# Patient Record
Sex: MALE | Race: BLACK OR AFRICAN AMERICAN | NOT HISPANIC OR LATINO
[De-identification: names, ages, dates, MRNs, and addresses within clinical notes are randomized per-mention and may not be internally consistent; named-entity substitution may affect disease eponyms.]

---

## 2017-07-27 ENCOUNTER — APPOINTMENT (OUTPATIENT)
Dept: INTERNAL MEDICINE | Facility: CLINIC | Age: 57
End: 2017-07-27
Payer: COMMERCIAL

## 2017-07-27 VITALS
HEART RATE: 63 BPM | HEIGHT: 71 IN | SYSTOLIC BLOOD PRESSURE: 150 MMHG | TEMPERATURE: 99.3 F | OXYGEN SATURATION: 97 % | BODY MASS INDEX: 32.62 KG/M2 | DIASTOLIC BLOOD PRESSURE: 90 MMHG | WEIGHT: 233 LBS

## 2017-07-27 DIAGNOSIS — Z00.00 ENCOUNTER FOR GENERAL ADULT MEDICAL EXAMINATION W/OUT ABNORMAL FINDINGS: ICD-10-CM

## 2017-07-27 PROCEDURE — 99396 PREV VISIT EST AGE 40-64: CPT | Mod: GC

## 2017-07-28 LAB
ANION GAP SERPL CALC-SCNC: 13 MMOL/L
BUN SERPL-MCNC: 9 MG/DL
CALCIUM SERPL-MCNC: 8.8 MG/DL
CHLORIDE SERPL-SCNC: 100 MMOL/L
CHOLEST SERPL-MCNC: 179 MG/DL
CHOLEST/HDLC SERPL: 4.4 RATIO
CO2 SERPL-SCNC: 28 MMOL/L
CREAT SERPL-MCNC: 1.01 MG/DL
GLUCOSE SERPL-MCNC: 98 MG/DL
HBA1C MFR BLD HPLC: 5.9 %
HDLC SERPL-MCNC: 41 MG/DL
LDLC SERPL CALC-MCNC: 106 MG/DL
POTASSIUM SERPL-SCNC: 4.1 MMOL/L
SODIUM SERPL-SCNC: 141 MMOL/L
TRIGL SERPL-MCNC: 161 MG/DL

## 2017-08-24 ENCOUNTER — APPOINTMENT (OUTPATIENT)
Dept: BARIATRICS | Facility: CLINIC | Age: 57
End: 2017-08-24
Payer: COMMERCIAL

## 2017-08-24 VITALS
HEART RATE: 69 BPM | TEMPERATURE: 98.1 F | WEIGHT: 231 LBS | HEIGHT: 71 IN | BODY MASS INDEX: 32.34 KG/M2 | SYSTOLIC BLOOD PRESSURE: 165 MMHG | DIASTOLIC BLOOD PRESSURE: 104 MMHG | OXYGEN SATURATION: 98 %

## 2017-08-24 DIAGNOSIS — K46.9 UNSPECIFIED ABDOMINAL HERNIA W/OUT OBSTRUCTION OR GANGRENE: ICD-10-CM

## 2017-08-24 PROCEDURE — 99203 OFFICE O/P NEW LOW 30 MIN: CPT

## 2017-08-25 ENCOUNTER — APPOINTMENT (OUTPATIENT)
Dept: INTERNAL MEDICINE | Facility: CLINIC | Age: 57
End: 2017-08-25
Payer: COMMERCIAL

## 2017-08-25 VITALS
SYSTOLIC BLOOD PRESSURE: 150 MMHG | OXYGEN SATURATION: 97 % | WEIGHT: 231 LBS | DIASTOLIC BLOOD PRESSURE: 98 MMHG | BODY MASS INDEX: 32.34 KG/M2 | HEIGHT: 71 IN | TEMPERATURE: 99 F | HEART RATE: 82 BPM

## 2017-08-25 VITALS — DIASTOLIC BLOOD PRESSURE: 92 MMHG | SYSTOLIC BLOOD PRESSURE: 138 MMHG

## 2017-08-25 DIAGNOSIS — Z01.818 ENCOUNTER FOR OTHER PREPROCEDURAL EXAMINATION: ICD-10-CM

## 2017-08-25 PROCEDURE — 99214 OFFICE O/P EST MOD 30 MIN: CPT | Mod: 25,GC

## 2017-08-25 PROCEDURE — 93000 ELECTROCARDIOGRAM COMPLETE: CPT

## 2017-08-25 PROCEDURE — 36415 COLL VENOUS BLD VENIPUNCTURE: CPT

## 2017-08-29 LAB
ALBUMIN SERPL ELPH-MCNC: 4.3 G/DL
ALP BLD-CCNC: 64 U/L
ALT SERPL-CCNC: 19 U/L
ANION GAP SERPL CALC-SCNC: 15 MMOL/L
APPEARANCE: ABNORMAL
APTT BLD: 36.5 SEC
AST SERPL-CCNC: 17 U/L
BACTERIA: NEGATIVE
BASOPHILS # BLD AUTO: 0.03 K/UL
BASOPHILS NFR BLD AUTO: 1 %
BILIRUB SERPL-MCNC: 0.5 MG/DL
BILIRUBIN URINE: NEGATIVE
BLOOD URINE: NEGATIVE
BUN SERPL-MCNC: 17 MG/DL
CALCIUM SERPL-MCNC: 9.2 MG/DL
CHLORIDE SERPL-SCNC: 104 MMOL/L
CO2 SERPL-SCNC: 25 MMOL/L
COLOR: YELLOW
CREAT SERPL-MCNC: 1.27 MG/DL
EOSINOPHIL # BLD AUTO: 0.13 K/UL
EOSINOPHIL NFR BLD AUTO: 4.3 %
GLUCOSE QUALITATIVE U: NORMAL
GLUCOSE SERPL-MCNC: 105 MG/DL
HBA1C MFR BLD HPLC: 5.8 %
HCT VFR BLD CALC: 43.8 %
HGB BLD-MCNC: 14.6 G/DL
HYALINE CASTS: 3 /LPF
IMM GRANULOCYTES NFR BLD AUTO: 0.3 %
INR PPP: 1.03 RATIO
KETONES URINE: NEGATIVE
LEUKOCYTE ESTERASE URINE: ABNORMAL
LYMPHOCYTES # BLD AUTO: 1.06 K/UL
LYMPHOCYTES NFR BLD AUTO: 35 %
MAN DIFF?: NORMAL
MCHC RBC-ENTMCNC: 29.9 PG
MCHC RBC-ENTMCNC: 33.3 GM/DL
MCV RBC AUTO: 89.8 FL
MICROSCOPIC-UA: NORMAL
MONOCYTES # BLD AUTO: 0.25 K/UL
MONOCYTES NFR BLD AUTO: 8.3 %
NEUTROPHILS # BLD AUTO: 1.55 K/UL
NEUTROPHILS NFR BLD AUTO: 51.1 %
NITRITE URINE: NEGATIVE
PH URINE: 5.5
PLATELET # BLD AUTO: 166 K/UL
POTASSIUM SERPL-SCNC: 4.3 MMOL/L
PROT SERPL-MCNC: 7.1 G/DL
PROTEIN URINE: NEGATIVE
PT BLD: 11.6 SEC
RBC # BLD: 4.88 M/UL
RBC # FLD: 13.8 %
RED BLOOD CELLS URINE: 5 /HPF
SODIUM SERPL-SCNC: 144 MMOL/L
SPECIFIC GRAVITY URINE: 1.03
SQUAMOUS EPITHELIAL CELLS: 2 /HPF
TSH SERPL-ACNC: 0.79 UIU/ML
UROBILINOGEN URINE: 1
WBC # FLD AUTO: 3.03 K/UL
WHITE BLOOD CELLS URINE: 8 /HPF

## 2017-09-04 ENCOUNTER — FORM ENCOUNTER (OUTPATIENT)
Age: 57
End: 2017-09-04

## 2017-09-05 ENCOUNTER — OUTPATIENT (OUTPATIENT)
Dept: OUTPATIENT SERVICES | Facility: HOSPITAL | Age: 57
LOS: 1 days | End: 2017-09-05
Payer: COMMERCIAL

## 2017-09-05 PROCEDURE — 71020: CPT | Mod: 26

## 2017-09-05 PROCEDURE — 71046 X-RAY EXAM CHEST 2 VIEWS: CPT

## 2017-09-19 ENCOUNTER — APPOINTMENT (OUTPATIENT)
Dept: BARIATRICS | Facility: HOSPITAL | Age: 57
End: 2017-09-19

## 2017-09-19 ENCOUNTER — RESULT REVIEW (OUTPATIENT)
Age: 57
End: 2017-09-19

## 2017-09-19 ENCOUNTER — OUTPATIENT (OUTPATIENT)
Dept: OUTPATIENT SERVICES | Facility: HOSPITAL | Age: 57
LOS: 1 days | Discharge: HOME | End: 2017-09-19
Payer: COMMERCIAL

## 2017-09-19 PROCEDURE — 27337 EXC THIGH/KNEE LES SC 3 CM/>: CPT | Mod: GC

## 2017-09-19 PROCEDURE — 19260: CPT | Mod: GC

## 2017-09-21 ENCOUNTER — OTHER (OUTPATIENT)
Age: 57
End: 2017-09-21

## 2017-09-22 LAB — SURGICAL PATHOLOGY STUDY: SIGNIFICANT CHANGE UP

## 2017-09-25 DIAGNOSIS — D17.39 BENIGN LIPOMATOUS NEOPLASM OF SKIN AND SUBCUTANEOUS TISSUE OF OTHER SITES: ICD-10-CM

## 2017-09-25 DIAGNOSIS — D17.23 BENIGN LIPOMATOUS NEOPLASM OF SKIN AND SUBCUTANEOUS TISSUE OF RIGHT LEG: ICD-10-CM

## 2017-09-28 ENCOUNTER — OTHER (OUTPATIENT)
Age: 57
End: 2017-09-28

## 2017-09-28 ENCOUNTER — APPOINTMENT (OUTPATIENT)
Dept: INTERNAL MEDICINE | Facility: CLINIC | Age: 57
End: 2017-09-28
Payer: COMMERCIAL

## 2017-09-28 VITALS — DIASTOLIC BLOOD PRESSURE: 90 MMHG | SYSTOLIC BLOOD PRESSURE: 171 MMHG

## 2017-09-28 VITALS
SYSTOLIC BLOOD PRESSURE: 171 MMHG | TEMPERATURE: 98.5 F | OXYGEN SATURATION: 97 % | DIASTOLIC BLOOD PRESSURE: 116 MMHG | HEIGHT: 71 IN | BODY MASS INDEX: 32.34 KG/M2 | HEART RATE: 61 BPM | WEIGHT: 231 LBS

## 2017-09-28 PROCEDURE — 99213 OFFICE O/P EST LOW 20 MIN: CPT | Mod: GE

## 2017-10-04 ENCOUNTER — APPOINTMENT (OUTPATIENT)
Dept: BARIATRICS | Facility: CLINIC | Age: 57
End: 2017-10-04
Payer: COMMERCIAL

## 2017-10-04 VITALS
DIASTOLIC BLOOD PRESSURE: 100 MMHG | BODY MASS INDEX: 32.06 KG/M2 | TEMPERATURE: 98.7 F | OXYGEN SATURATION: 98 % | HEIGHT: 71 IN | SYSTOLIC BLOOD PRESSURE: 152 MMHG | WEIGHT: 229 LBS | HEART RATE: 61 BPM

## 2017-10-04 PROCEDURE — 99024 POSTOP FOLLOW-UP VISIT: CPT

## 2017-11-16 ENCOUNTER — APPOINTMENT (OUTPATIENT)
Dept: INTERNAL MEDICINE | Facility: CLINIC | Age: 57
End: 2017-11-16
Payer: COMMERCIAL

## 2017-11-16 VITALS
WEIGHT: 234 LBS | BODY MASS INDEX: 32.64 KG/M2 | OXYGEN SATURATION: 96 % | TEMPERATURE: 98.5 F | DIASTOLIC BLOOD PRESSURE: 100 MMHG | SYSTOLIC BLOOD PRESSURE: 160 MMHG | HEART RATE: 60 BPM

## 2017-11-16 PROCEDURE — 99213 OFFICE O/P EST LOW 20 MIN: CPT | Mod: GE

## 2017-11-16 RX ORDER — LISINOPRIL 20 MG/1
20 TABLET ORAL DAILY
Qty: 30 | Refills: 2 | Status: DISCONTINUED | COMMUNITY
Start: 2017-09-28 | End: 2017-11-16

## 2018-01-11 ENCOUNTER — APPOINTMENT (OUTPATIENT)
Dept: INTERNAL MEDICINE | Facility: CLINIC | Age: 58
End: 2018-01-11
Payer: COMMERCIAL

## 2018-01-11 VITALS
TEMPERATURE: 98.9 F | BODY MASS INDEX: 32.08 KG/M2 | OXYGEN SATURATION: 98 % | SYSTOLIC BLOOD PRESSURE: 162 MMHG | WEIGHT: 230 LBS | HEART RATE: 54 BPM | DIASTOLIC BLOOD PRESSURE: 93 MMHG

## 2018-01-11 DIAGNOSIS — Z98.890 OTHER SPECIFIED POSTPROCEDURAL STATES: ICD-10-CM

## 2018-01-11 DIAGNOSIS — Z86.018 OTHER SPECIFIED POSTPROCEDURAL STATES: ICD-10-CM

## 2018-01-11 DIAGNOSIS — D17.9 BENIGN LIPOMATOUS NEOPLASM, UNSPECIFIED: ICD-10-CM

## 2018-01-11 PROCEDURE — 99214 OFFICE O/P EST MOD 30 MIN: CPT | Mod: GC

## 2018-01-11 RX ORDER — HYDROCHLOROTHIAZIDE 12.5 MG/1
12.5 TABLET ORAL DAILY
Qty: 30 | Refills: 5 | Status: DISCONTINUED | COMMUNITY
Start: 2017-11-16 | End: 2018-01-11

## 2018-01-29 ENCOUNTER — RX RENEWAL (OUTPATIENT)
Age: 58
End: 2018-01-29

## 2018-03-08 ENCOUNTER — APPOINTMENT (OUTPATIENT)
Dept: INTERNAL MEDICINE | Facility: CLINIC | Age: 58
End: 2018-03-08
Payer: COMMERCIAL

## 2018-03-08 VITALS
SYSTOLIC BLOOD PRESSURE: 152 MMHG | BODY MASS INDEX: 32.22 KG/M2 | WEIGHT: 231 LBS | DIASTOLIC BLOOD PRESSURE: 83 MMHG | TEMPERATURE: 98.5 F

## 2018-03-08 PROCEDURE — 99213 OFFICE O/P EST LOW 20 MIN: CPT | Mod: 25,GE

## 2018-03-08 PROCEDURE — 36415 COLL VENOUS BLD VENIPUNCTURE: CPT

## 2018-03-08 RX ORDER — ASPIRIN 81 MG/1
81 TABLET ORAL
Qty: 1 | Refills: 0 | Status: DISCONTINUED | COMMUNITY
Start: 2017-11-16 | End: 2018-03-08

## 2018-03-15 LAB
25(OH)D3 SERPL-MCNC: 27.2 NG/ML
ANION GAP SERPL CALC-SCNC: 12 MMOL/L
BUN SERPL-MCNC: 11 MG/DL
CALCIUM SERPL-MCNC: 10.2 MG/DL
CHLORIDE SERPL-SCNC: 100 MMOL/L
CHOLEST SERPL-MCNC: 131 MG/DL
CHOLEST/HDLC SERPL: 3 RATIO
CO2 SERPL-SCNC: 30 MMOL/L
CREAT SERPL-MCNC: 1.12 MG/DL
GLUCOSE SERPL-MCNC: 88 MG/DL
HBA1C MFR BLD HPLC: 6 %
HDLC SERPL-MCNC: 44 MG/DL
LDLC SERPL CALC-MCNC: 66 MG/DL
POTASSIUM SERPL-SCNC: 4.2 MMOL/L
SODIUM SERPL-SCNC: 142 MMOL/L
TRIGL SERPL-MCNC: 107 MG/DL

## 2018-05-11 ENCOUNTER — APPOINTMENT (OUTPATIENT)
Dept: INTERNAL MEDICINE | Facility: CLINIC | Age: 58
End: 2018-05-11
Payer: COMMERCIAL

## 2018-05-11 VITALS
TEMPERATURE: 98 F | HEIGHT: 71 IN | DIASTOLIC BLOOD PRESSURE: 67 MMHG | BODY MASS INDEX: 32.34 KG/M2 | WEIGHT: 231 LBS | OXYGEN SATURATION: 98 % | SYSTOLIC BLOOD PRESSURE: 114 MMHG | HEART RATE: 100 BPM

## 2018-05-11 PROCEDURE — 99213 OFFICE O/P EST LOW 20 MIN: CPT | Mod: GE

## 2018-05-14 LAB
CREAT SPEC-SCNC: 249 MG/DL
MICROALBUMIN 24H UR DL<=1MG/L-MCNC: 4 MG/DL
MICROALBUMIN/CREAT 24H UR-RTO: 16 MG/G

## 2018-06-04 ENCOUNTER — RX RENEWAL (OUTPATIENT)
Age: 58
End: 2018-06-04

## 2018-06-12 ENCOUNTER — MEDICATION RENEWAL (OUTPATIENT)
Age: 58
End: 2018-06-12

## 2018-07-30 ENCOUNTER — RX RENEWAL (OUTPATIENT)
Age: 58
End: 2018-07-30

## 2018-08-22 ENCOUNTER — RX RENEWAL (OUTPATIENT)
Age: 58
End: 2018-08-22

## 2018-09-06 ENCOUNTER — APPOINTMENT (OUTPATIENT)
Dept: INTERNAL MEDICINE | Facility: CLINIC | Age: 58
End: 2018-09-06
Payer: COMMERCIAL

## 2018-09-06 VITALS
DIASTOLIC BLOOD PRESSURE: 96 MMHG | HEIGHT: 71 IN | HEART RATE: 67 BPM | SYSTOLIC BLOOD PRESSURE: 155 MMHG | BODY MASS INDEX: 32.34 KG/M2 | WEIGHT: 231 LBS | OXYGEN SATURATION: 95 % | TEMPERATURE: 99.1 F

## 2018-09-06 DIAGNOSIS — R73.09 OTHER ABNORMAL GLUCOSE: ICD-10-CM

## 2018-09-06 PROCEDURE — 99213 OFFICE O/P EST LOW 20 MIN: CPT | Mod: GE

## 2018-09-06 RX ORDER — LISINOPRIL 40 MG/1
40 TABLET ORAL DAILY
Qty: 30 | Refills: 3 | Status: COMPLETED | COMMUNITY
Start: 2017-11-16 | End: 2018-09-06

## 2018-09-06 RX ORDER — HYDROCHLOROTHIAZIDE 25 MG/1
25 TABLET ORAL DAILY
Qty: 90 | Refills: 0 | Status: COMPLETED | COMMUNITY
Start: 2018-01-11 | End: 2018-09-06

## 2018-09-17 ENCOUNTER — RX RENEWAL (OUTPATIENT)
Age: 58
End: 2018-09-17

## 2018-12-19 ENCOUNTER — RX RENEWAL (OUTPATIENT)
Age: 58
End: 2018-12-19

## 2021-02-04 ENCOUNTER — APPOINTMENT (OUTPATIENT)
Dept: INTERNAL MEDICINE | Facility: CLINIC | Age: 61
End: 2021-02-04
Payer: COMMERCIAL

## 2021-02-04 VITALS
DIASTOLIC BLOOD PRESSURE: 122 MMHG | SYSTOLIC BLOOD PRESSURE: 174 MMHG | TEMPERATURE: 98 F | BODY MASS INDEX: 34.17 KG/M2 | HEART RATE: 79 BPM | OXYGEN SATURATION: 96 % | WEIGHT: 245 LBS

## 2021-02-04 VITALS — DIASTOLIC BLOOD PRESSURE: 110 MMHG | SYSTOLIC BLOOD PRESSURE: 170 MMHG

## 2021-02-04 DIAGNOSIS — Z13.1 ENCOUNTER FOR SCREENING FOR DIABETES MELLITUS: ICD-10-CM

## 2021-02-04 DIAGNOSIS — I10 ESSENTIAL (PRIMARY) HYPERTENSION: ICD-10-CM

## 2021-02-04 DIAGNOSIS — Z23 ENCOUNTER FOR IMMUNIZATION: ICD-10-CM

## 2021-02-04 DIAGNOSIS — I16.0 HYPERTENSIVE URGENCY: ICD-10-CM

## 2021-02-04 DIAGNOSIS — Z12.11 ENCOUNTER FOR SCREENING FOR MALIGNANT NEOPLASM OF COLON: ICD-10-CM

## 2021-02-04 PROCEDURE — G0008: CPT

## 2021-02-04 PROCEDURE — 99213 OFFICE O/P EST LOW 20 MIN: CPT | Mod: GC,25

## 2021-02-04 PROCEDURE — 99072 ADDL SUPL MATRL&STAF TM PHE: CPT

## 2021-02-04 PROCEDURE — 36415 COLL VENOUS BLD VENIPUNCTURE: CPT

## 2021-02-04 PROCEDURE — 99396 PREV VISIT EST AGE 40-64: CPT | Mod: 25

## 2021-02-04 PROCEDURE — 90686 IIV4 VACC NO PRSV 0.5 ML IM: CPT

## 2021-02-09 LAB
CREAT SPEC-SCNC: 170 MG/DL
MICROALBUMIN 24H UR DL<=1MG/L-MCNC: 2.6 MG/DL
MICROALBUMIN/CREAT 24H UR-RTO: 15 MG/G

## 2021-02-11 ENCOUNTER — APPOINTMENT (OUTPATIENT)
Dept: INTERNAL MEDICINE | Facility: CLINIC | Age: 61
End: 2021-02-11
Payer: COMMERCIAL

## 2021-02-11 VITALS
TEMPERATURE: 98.3 F | SYSTOLIC BLOOD PRESSURE: 167 MMHG | HEART RATE: 79 BPM | RESPIRATION RATE: 16 BRPM | DIASTOLIC BLOOD PRESSURE: 95 MMHG | OXYGEN SATURATION: 94 %

## 2021-02-11 DIAGNOSIS — L73.1 PSEUDOFOLLICULITIS BARBAE: ICD-10-CM

## 2021-02-11 LAB
ANION GAP SERPL CALC-SCNC: 22 MMOL/L
BUN SERPL-MCNC: 10 MG/DL
CALCIUM SERPL-MCNC: 9.5 MG/DL
CHLORIDE SERPL-SCNC: 102 MMOL/L
CHOLEST SERPL-MCNC: 215 MG/DL
CO2 SERPL-SCNC: 18 MMOL/L
CREAT SERPL-MCNC: 1.11 MG/DL
ESTIMATED AVERAGE GLUCOSE: 120 MG/DL
GLUCOSE SERPL-MCNC: 83 MG/DL
HBA1C MFR BLD HPLC: 5.8 %
HDLC SERPL-MCNC: 43 MG/DL
LDLC SERPL CALC-MCNC: 148 MG/DL
NONHDLC SERPL-MCNC: 172 MG/DL
POTASSIUM SERPL-SCNC: 4 MMOL/L
SODIUM SERPL-SCNC: 142 MMOL/L
TRIGL SERPL-MCNC: 120 MG/DL

## 2021-02-11 PROCEDURE — 99072 ADDL SUPL MATRL&STAF TM PHE: CPT

## 2021-02-11 PROCEDURE — 99214 OFFICE O/P EST MOD 30 MIN: CPT | Mod: GC

## 2021-02-11 NOTE — PHYSICAL EXAM
[No Acute Distress] : no acute distress [Well Nourished] : well nourished [Well Developed] : well developed [Normal Sclera/Conjunctiva] : normal sclera/conjunctiva [PERRL] : pupils equal round and reactive to light [EOMI] : extraocular movements intact [Normal Outer Ear/Nose] : the outer ears and nose were normal in appearance [Normal Oropharynx] : the oropharynx was normal [No JVD] : no jugular venous distention [No Lymphadenopathy] : no lymphadenopathy [Supple] : supple [No Respiratory Distress] : no respiratory distress  [No Accessory Muscle Use] : no accessory muscle use [Clear to Auscultation] : lungs were clear to auscultation bilaterally [Normal Rate] : normal rate  [Regular Rhythm] : with a regular rhythm [Normal S1, S2] : normal S1 and S2 [No Murmur] : no murmur heard [Soft] : abdomen soft [Non Tender] : non-tender [Non-distended] : non-distended [Normal Bowel Sounds] : normal bowel sounds [No CVA Tenderness] : no CVA  tenderness [No Spinal Tenderness] : no spinal tenderness [Normal] : no CVA or spinal tenderness

## 2021-02-12 PROBLEM — L73.1 PSEUDOFOLLICULITIS BARBAE: Status: ACTIVE | Noted: 2021-02-09

## 2021-02-12 NOTE — END OF VISIT
[FreeTextEntry3] : I saw and evaluated the patient.  The findings and assessment were discussed with the resident and I agree with resident’s plan as documented in the above, in the resident’s note.\par \par Pertinent exam findings: Well-appearing, NAD. \par \par Elevated BP: Improved from last visit.  Needs second agent.  Start losartan 50mg today.  May need HCTZ in future, will consider combo pill to decrease pill burden.  C/w norvasc\par \par C/w lifestyle mods for pre-DM and obesity.\par \par RTC 1 week for BP reassessment.\par \par I spent more than 30 minutes for the total time of this encounter, including time spent face-to-face with the patient.

## 2021-02-12 NOTE — PLAN
[FreeTextEntry1] : #Hypertension - patient with hx of HTN controlled prior with norvasc 10mg qd, patient not compliant with meds for 2 years presents now with BP in 170s. Denies HA, changes in vision, back pain, or chest pain. Norvasc 10 was restarted last week, BP still uncontrolled. \par - start losartan 50mg. Patient advised that if he develops any lightheadedness, syncope to present to call office and stop taking med. \par - c/w norvasc 10mg qd\par - return to clinic in one week for reassessment\par - f/u optho \par \par #Pre-diabetes - patient with a1c of 5.7 recently checked\par - counseled on weigh loss and lifestyle modifications\par \par #Obesity - patient with BMI of 34\par - counseled on weight loss, meal portion sizes and calorie tracking\par - counseled on increase exercise\par \par #HLD\par - c/w lipitor 40mg qd\par \par HCM\par - Flu shot utd\par - f/u GI referral for colonoscopy\par - ophth f/u for HTN\par \par RTC IN ONE WEEK FOR BP REASSESMENT.

## 2021-02-12 NOTE — HISTORY OF PRESENT ILLNESS
[FreeTextEntry1] : HTN [de-identified] : 60yr old M with a PMHx of HTN, pre-DM, HLD elevated ASCVD presents for follow up after initiation of BP meds. Patient was evaluated in office last week and was found to be hypertensive to 170s plan was to re-initiate amlodipine 10mg and one week f/u for reassessment. Patient reports tolerating meds well w/o any side effects. Patient denies HA, CP, sob, back pain.

## 2021-02-18 ENCOUNTER — APPOINTMENT (OUTPATIENT)
Dept: INTERNAL MEDICINE | Facility: CLINIC | Age: 61
End: 2021-02-18
Payer: COMMERCIAL

## 2021-02-18 VITALS — SYSTOLIC BLOOD PRESSURE: 142 MMHG | DIASTOLIC BLOOD PRESSURE: 92 MMHG | HEART RATE: 70 BPM

## 2021-02-18 PROCEDURE — 99072 ADDL SUPL MATRL&STAF TM PHE: CPT

## 2021-02-18 PROCEDURE — 99211 OFF/OP EST MAY X REQ PHY/QHP: CPT

## 2021-03-10 ENCOUNTER — RX RENEWAL (OUTPATIENT)
Age: 61
End: 2021-03-10

## 2021-03-29 ENCOUNTER — RX RENEWAL (OUTPATIENT)
Age: 61
End: 2021-03-29

## 2021-04-01 ENCOUNTER — APPOINTMENT (OUTPATIENT)
Dept: INTERNAL MEDICINE | Facility: CLINIC | Age: 61
End: 2021-04-01
Payer: COMMERCIAL

## 2021-04-01 VITALS
SYSTOLIC BLOOD PRESSURE: 132 MMHG | TEMPERATURE: 98.9 F | BODY MASS INDEX: 34.44 KG/M2 | HEART RATE: 87 BPM | OXYGEN SATURATION: 97 % | HEIGHT: 71 IN | WEIGHT: 246 LBS | RESPIRATION RATE: 15 BRPM | DIASTOLIC BLOOD PRESSURE: 90 MMHG

## 2021-04-01 DIAGNOSIS — E66.9 OBESITY, UNSPECIFIED: ICD-10-CM

## 2021-04-01 PROCEDURE — 99072 ADDL SUPL MATRL&STAF TM PHE: CPT

## 2021-04-01 PROCEDURE — 36415 COLL VENOUS BLD VENIPUNCTURE: CPT

## 2021-04-01 PROCEDURE — 99214 OFFICE O/P EST MOD 30 MIN: CPT | Mod: 25,GC

## 2021-04-08 LAB
ANION GAP SERPL CALC-SCNC: 14 MMOL/L
BUN SERPL-MCNC: 9 MG/DL
CALCIUM SERPL-MCNC: 9.1 MG/DL
CHLORIDE SERPL-SCNC: 101 MMOL/L
CO2 SERPL-SCNC: 26 MMOL/L
CREAT SERPL-MCNC: 1.11 MG/DL
GLUCOSE SERPL-MCNC: 109 MG/DL
POTASSIUM SERPL-SCNC: 3.8 MMOL/L
SODIUM SERPL-SCNC: 142 MMOL/L

## 2021-04-27 ENCOUNTER — RX RENEWAL (OUTPATIENT)
Age: 61
End: 2021-04-27

## 2021-05-27 ENCOUNTER — RX RENEWAL (OUTPATIENT)
Age: 61
End: 2021-05-27

## 2021-06-24 ENCOUNTER — APPOINTMENT (OUTPATIENT)
Dept: INTERNAL MEDICINE | Facility: CLINIC | Age: 61
End: 2021-06-24
Payer: COMMERCIAL

## 2021-06-24 ENCOUNTER — NON-APPOINTMENT (OUTPATIENT)
Age: 61
End: 2021-06-24

## 2021-06-24 VITALS
TEMPERATURE: 98.4 F | BODY MASS INDEX: 33.88 KG/M2 | HEIGHT: 71 IN | HEART RATE: 73 BPM | SYSTOLIC BLOOD PRESSURE: 153 MMHG | DIASTOLIC BLOOD PRESSURE: 96 MMHG | RESPIRATION RATE: 14 BRPM | WEIGHT: 242 LBS | OXYGEN SATURATION: 96 %

## 2021-06-24 DIAGNOSIS — E78.5 HYPERLIPIDEMIA, UNSPECIFIED: ICD-10-CM

## 2021-06-24 PROCEDURE — 36415 COLL VENOUS BLD VENIPUNCTURE: CPT

## 2021-06-24 PROCEDURE — 99072 ADDL SUPL MATRL&STAF TM PHE: CPT

## 2021-06-24 PROCEDURE — 99213 OFFICE O/P EST LOW 20 MIN: CPT | Mod: GC,25

## 2021-06-24 RX ORDER — HYDROCORTISONE 25 MG/G
2.5 CREAM TOPICAL 3 TIMES DAILY
Qty: 1 | Refills: 0 | Status: DISCONTINUED | COMMUNITY
Start: 2021-02-09 | End: 2021-06-24

## 2021-06-25 LAB
ALBUMIN SERPL ELPH-MCNC: 4.5 G/DL
ALP BLD-CCNC: 90 U/L
ALT SERPL-CCNC: 26 U/L
ANION GAP SERPL CALC-SCNC: 11 MMOL/L
AST SERPL-CCNC: 19 U/L
BILIRUB SERPL-MCNC: 0.4 MG/DL
BUN SERPL-MCNC: 8 MG/DL
CALCIUM SERPL-MCNC: 9.6 MG/DL
CHLORIDE SERPL-SCNC: 104 MMOL/L
CHOLEST SERPL-MCNC: 164 MG/DL
CO2 SERPL-SCNC: 27 MMOL/L
CREAT SERPL-MCNC: 1.02 MG/DL
CREAT SPEC-SCNC: 189 MG/DL
ESTIMATED AVERAGE GLUCOSE: 120 MG/DL
GLUCOSE SERPL-MCNC: 94 MG/DL
HBA1C MFR BLD HPLC: 5.8 %
HDLC SERPL-MCNC: 41 MG/DL
LDLC SERPL CALC-MCNC: 80 MG/DL
MICROALBUMIN 24H UR DL<=1MG/L-MCNC: 1.6 MG/DL
MICROALBUMIN/CREAT 24H UR-RTO: 9 MG/G
NONHDLC SERPL-MCNC: 123 MG/DL
POTASSIUM SERPL-SCNC: 4 MMOL/L
PROT SERPL-MCNC: 7.4 G/DL
SODIUM SERPL-SCNC: 141 MMOL/L
TRIGL SERPL-MCNC: 217 MG/DL

## 2021-08-05 ENCOUNTER — APPOINTMENT (OUTPATIENT)
Dept: INTERNAL MEDICINE | Facility: CLINIC | Age: 61
End: 2021-08-05
Payer: COMMERCIAL

## 2021-08-05 ENCOUNTER — LABORATORY RESULT (OUTPATIENT)
Age: 61
End: 2021-08-05

## 2021-08-05 VITALS
TEMPERATURE: 98.1 F | HEART RATE: 80 BPM | RESPIRATION RATE: 14 BRPM | DIASTOLIC BLOOD PRESSURE: 87 MMHG | WEIGHT: 242 LBS | OXYGEN SATURATION: 98 % | BODY MASS INDEX: 33.75 KG/M2 | SYSTOLIC BLOOD PRESSURE: 130 MMHG

## 2021-08-05 DIAGNOSIS — R73.03 PREDIABETES.: ICD-10-CM

## 2021-08-05 DIAGNOSIS — R35.0 FREQUENCY OF MICTURITION: ICD-10-CM

## 2021-08-05 DIAGNOSIS — E78.5 HYPERLIPIDEMIA, UNSPECIFIED: ICD-10-CM

## 2021-08-05 DIAGNOSIS — I10 ESSENTIAL (PRIMARY) HYPERTENSION: ICD-10-CM

## 2021-08-05 PROCEDURE — 99214 OFFICE O/P EST MOD 30 MIN: CPT | Mod: GC

## 2021-08-05 RX ORDER — AMLODIPINE BESYLATE 10 MG/1
10 TABLET ORAL TWICE DAILY
Qty: 90 | Refills: 1 | Status: ACTIVE | COMMUNITY
Start: 2018-03-08 | End: 1900-01-01

## 2021-08-05 RX ORDER — ATORVASTATIN CALCIUM 40 MG/1
40 TABLET, FILM COATED ORAL
Qty: 90 | Refills: 3 | Status: ACTIVE | COMMUNITY
Start: 2017-09-28 | End: 1900-01-01

## 2021-08-05 RX ORDER — LOSARTAN POTASSIUM 100 MG/1
100 TABLET, FILM COATED ORAL DAILY
Qty: 1 | Refills: 1 | Status: ACTIVE | COMMUNITY
Start: 2021-02-11 | End: 1900-01-01

## 2021-08-06 PROBLEM — R73.03 PREDIABETES: Status: ACTIVE | Noted: 2021-02-04

## 2021-08-06 PROBLEM — E78.5 HLD (HYPERLIPIDEMIA): Status: ACTIVE | Noted: 2021-02-04

## 2021-08-06 PROBLEM — R35.0 URINARY FREQUENCY: Status: ACTIVE | Noted: 2021-08-05

## 2021-08-09 DIAGNOSIS — N39.0 URINARY TRACT INFECTION, SITE NOT SPECIFIED: ICD-10-CM

## 2021-08-09 LAB
APPEARANCE: ABNORMAL
BILIRUBIN URINE: NEGATIVE
BLOOD URINE: NORMAL
COLOR: YELLOW
GLUCOSE QUALITATIVE U: NEGATIVE
KETONES URINE: NEGATIVE
LEUKOCYTE ESTERASE URINE: ABNORMAL
NITRITE URINE: POSITIVE
PH URINE: 6.5
PROTEIN URINE: NORMAL
SPECIFIC GRAVITY URINE: 1.02
UROBILINOGEN URINE: NORMAL

## 2021-08-09 RX ORDER — SULFAMETHOXAZOLE AND TRIMETHOPRIM 800; 160 MG/1; MG/1
800-160 TABLET ORAL TWICE DAILY
Qty: 6 | Refills: 0 | Status: ACTIVE | COMMUNITY
Start: 2021-08-09 | End: 1900-01-01

## 2022-05-23 ENCOUNTER — RX RENEWAL (OUTPATIENT)
Age: 62
End: 2022-05-23